# Patient Record
Sex: FEMALE | Race: ASIAN | NOT HISPANIC OR LATINO | Employment: FULL TIME | URBAN - METROPOLITAN AREA
[De-identification: names, ages, dates, MRNs, and addresses within clinical notes are randomized per-mention and may not be internally consistent; named-entity substitution may affect disease eponyms.]

---

## 2023-04-14 ENCOUNTER — HOSPITAL ENCOUNTER (EMERGENCY)
Facility: CLINIC | Age: 33
Discharge: HOME | End: 2023-04-15
Attending: EMERGENCY MEDICINE | Admitting: EMERGENCY MEDICINE
Payer: COMMERCIAL

## 2023-04-14 DIAGNOSIS — R06.02 SOB (SHORTNESS OF BREATH): ICD-10-CM

## 2023-04-14 DIAGNOSIS — R07.9 ACUTE CHEST PAIN: ICD-10-CM

## 2023-04-14 PROCEDURE — 93005 ELECTROCARDIOGRAM TRACING: CPT | Performed by: EMERGENCY MEDICINE

## 2023-04-14 PROCEDURE — 99285 EMERGENCY DEPT VISIT HI MDM: CPT | Mod: 25

## 2023-04-14 PROCEDURE — 96361 HYDRATE IV INFUSION ADD-ON: CPT

## 2023-04-14 PROCEDURE — 96374 THER/PROPH/DIAG INJ IV PUSH: CPT | Mod: 59

## 2023-04-14 NOTE — Clinical Note
Ivette Lam was seen and treated in our emergency department on 4/14/2023.  She may return to work on 04/16/2023.       If you have any questions or concerns, please don't hesitate to call.      Lin Louise MD

## 2023-04-15 ENCOUNTER — APPOINTMENT (OUTPATIENT)
Dept: CT IMAGING | Facility: CLINIC | Age: 33
End: 2023-04-15
Attending: EMERGENCY MEDICINE
Payer: COMMERCIAL

## 2023-04-15 VITALS
DIASTOLIC BLOOD PRESSURE: 78 MMHG | HEART RATE: 87 BPM | OXYGEN SATURATION: 99 % | RESPIRATION RATE: 18 BRPM | TEMPERATURE: 98.9 F | SYSTOLIC BLOOD PRESSURE: 121 MMHG | WEIGHT: 155 LBS

## 2023-04-15 LAB
ALBUMIN SERPL-MCNC: 4.3 G/DL (ref 3.5–5)
ALP SERPL-CCNC: 54 U/L (ref 45–120)
ALT SERPL W P-5'-P-CCNC: 18 U/L (ref 0–45)
ANION GAP SERPL CALCULATED.3IONS-SCNC: 10 MMOL/L (ref 5–18)
AST SERPL W P-5'-P-CCNC: 20 U/L (ref 0–40)
ATRIAL RATE - MUSE: 120 BPM
BASOPHILS # BLD AUTO: 0.1 10E3/UL (ref 0–0.2)
BASOPHILS NFR BLD AUTO: 0 %
BILIRUB DIRECT SERPL-MCNC: <0.1 MG/DL
BILIRUB SERPL-MCNC: 0.3 MG/DL (ref 0–1)
BUN SERPL-MCNC: 11 MG/DL (ref 8–22)
CALCIUM SERPL-MCNC: 9.2 MG/DL (ref 8.5–10.5)
CHLORIDE BLD-SCNC: 106 MMOL/L (ref 98–107)
CO2 SERPL-SCNC: 20 MMOL/L (ref 22–31)
CREAT SERPL-MCNC: 0.76 MG/DL (ref 0.6–1.1)
DIASTOLIC BLOOD PRESSURE - MUSE: NORMAL MMHG
EOSINOPHIL # BLD AUTO: 0.3 10E3/UL (ref 0–0.7)
EOSINOPHIL NFR BLD AUTO: 2 %
ERYTHROCYTE [DISTWIDTH] IN BLOOD BY AUTOMATED COUNT: 12.5 % (ref 10–15)
GFR SERPL CREATININE-BSD FRML MDRD: >90 ML/MIN/1.73M2
GLUCOSE BLD-MCNC: 101 MG/DL (ref 70–125)
HCG SERPL QL: NEGATIVE
HCT VFR BLD AUTO: 38.4 % (ref 35–47)
HGB BLD-MCNC: 13.3 G/DL (ref 11.7–15.7)
HOLD SPECIMEN: NORMAL
IMM GRANULOCYTES # BLD: 0 10E3/UL
IMM GRANULOCYTES NFR BLD: 0 %
INTERPRETATION ECG - MUSE: NORMAL
LIPASE SERPL-CCNC: 33 U/L (ref 0–52)
LYMPHOCYTES # BLD AUTO: 3.6 10E3/UL (ref 0.8–5.3)
LYMPHOCYTES NFR BLD AUTO: 30 %
MAGNESIUM SERPL-MCNC: 1.9 MG/DL (ref 1.8–2.6)
MCH RBC QN AUTO: 30.8 PG (ref 26.5–33)
MCHC RBC AUTO-ENTMCNC: 34.6 G/DL (ref 31.5–36.5)
MCV RBC AUTO: 89 FL (ref 78–100)
MONOCYTES # BLD AUTO: 0.6 10E3/UL (ref 0–1.3)
MONOCYTES NFR BLD AUTO: 5 %
NEUTROPHILS # BLD AUTO: 7.6 10E3/UL (ref 1.6–8.3)
NEUTROPHILS NFR BLD AUTO: 63 %
NRBC # BLD AUTO: 0 10E3/UL
NRBC BLD AUTO-RTO: 0 /100
P AXIS - MUSE: 70 DEGREES
PLATELET # BLD AUTO: 300 10E3/UL (ref 150–450)
POTASSIUM BLD-SCNC: 3.9 MMOL/L (ref 3.5–5)
PR INTERVAL - MUSE: 156 MS
PROT SERPL-MCNC: 8.4 G/DL (ref 6–8)
QRS DURATION - MUSE: 66 MS
QT - MUSE: 280 MS
QTC - MUSE: 395 MS
R AXIS - MUSE: 86 DEGREES
RBC # BLD AUTO: 4.32 10E6/UL (ref 3.8–5.2)
SODIUM SERPL-SCNC: 136 MMOL/L (ref 136–145)
SYSTOLIC BLOOD PRESSURE - MUSE: NORMAL MMHG
T AXIS - MUSE: 42 DEGREES
TROPONIN I SERPL-MCNC: <0.01 NG/ML (ref 0–0.29)
VENTRICULAR RATE- MUSE: 120 BPM
WBC # BLD AUTO: 12.1 10E3/UL (ref 4–11)

## 2023-04-15 PROCEDURE — 84484 ASSAY OF TROPONIN QUANT: CPT | Performed by: EMERGENCY MEDICINE

## 2023-04-15 PROCEDURE — 74174 CTA ABD&PLVS W/CONTRAST: CPT

## 2023-04-15 PROCEDURE — 250N000009 HC RX 250: Performed by: EMERGENCY MEDICINE

## 2023-04-15 PROCEDURE — 84703 CHORIONIC GONADOTROPIN ASSAY: CPT | Performed by: EMERGENCY MEDICINE

## 2023-04-15 PROCEDURE — 82248 BILIRUBIN DIRECT: CPT | Performed by: EMERGENCY MEDICINE

## 2023-04-15 PROCEDURE — 250N000011 HC RX IP 250 OP 636: Performed by: EMERGENCY MEDICINE

## 2023-04-15 PROCEDURE — 83690 ASSAY OF LIPASE: CPT | Performed by: EMERGENCY MEDICINE

## 2023-04-15 PROCEDURE — 85025 COMPLETE CBC W/AUTO DIFF WBC: CPT | Performed by: EMERGENCY MEDICINE

## 2023-04-15 PROCEDURE — 94640 AIRWAY INHALATION TREATMENT: CPT

## 2023-04-15 PROCEDURE — 36415 COLL VENOUS BLD VENIPUNCTURE: CPT | Performed by: EMERGENCY MEDICINE

## 2023-04-15 PROCEDURE — 999N000157 HC STATISTIC RCP TIME EA 10 MIN

## 2023-04-15 PROCEDURE — 70450 CT HEAD/BRAIN W/O DYE: CPT

## 2023-04-15 PROCEDURE — 258N000003 HC RX IP 258 OP 636: Performed by: EMERGENCY MEDICINE

## 2023-04-15 PROCEDURE — 80053 COMPREHEN METABOLIC PANEL: CPT | Performed by: EMERGENCY MEDICINE

## 2023-04-15 PROCEDURE — 83735 ASSAY OF MAGNESIUM: CPT | Performed by: EMERGENCY MEDICINE

## 2023-04-15 RX ORDER — IPRATROPIUM BROMIDE AND ALBUTEROL SULFATE 2.5; .5 MG/3ML; MG/3ML
3 SOLUTION RESPIRATORY (INHALATION) ONCE
Status: DISCONTINUED | OUTPATIENT
Start: 2023-04-15 | End: 2023-04-15

## 2023-04-15 RX ORDER — LEVALBUTEROL INHALATION SOLUTION 1.25 MG/3ML
1.25 SOLUTION RESPIRATORY (INHALATION) ONCE
Status: COMPLETED | OUTPATIENT
Start: 2023-04-15 | End: 2023-04-15

## 2023-04-15 RX ORDER — IOPAMIDOL 755 MG/ML
90 INJECTION, SOLUTION INTRAVASCULAR ONCE
Status: COMPLETED | OUTPATIENT
Start: 2023-04-15 | End: 2023-04-15

## 2023-04-15 RX ORDER — METHYLPREDNISOLONE SODIUM SUCCINATE 125 MG/2ML
125 INJECTION, POWDER, LYOPHILIZED, FOR SOLUTION INTRAMUSCULAR; INTRAVENOUS ONCE
Status: COMPLETED | OUTPATIENT
Start: 2023-04-15 | End: 2023-04-15

## 2023-04-15 RX ORDER — PREDNISONE 50 MG/1
50 TABLET ORAL DAILY
Qty: 4 TABLET | Refills: 0 | Status: SHIPPED | OUTPATIENT
Start: 2023-04-15 | End: 2023-04-19

## 2023-04-15 RX ADMIN — METHYLPREDNISOLONE SODIUM SUCCINATE 125 MG: 125 INJECTION, POWDER, FOR SOLUTION INTRAMUSCULAR; INTRAVENOUS at 03:17

## 2023-04-15 RX ADMIN — IOPAMIDOL 90 ML: 755 INJECTION, SOLUTION INTRAVENOUS at 01:56

## 2023-04-15 RX ADMIN — SODIUM CHLORIDE 1000 ML: 9 INJECTION, SOLUTION INTRAVENOUS at 00:55

## 2023-04-15 RX ADMIN — LEVALBUTEROL HYDROCHLORIDE 1.25 MG: 1.25 SOLUTION RESPIRATORY (INHALATION) at 00:19

## 2023-04-15 RX ADMIN — IPRATROPIUM BROMIDE 0.5 MG: 0.5 SOLUTION RESPIRATORY (INHALATION) at 00:19

## 2023-04-15 ASSESSMENT — ENCOUNTER SYMPTOMS
FEVER: 0
SPEECH DIFFICULTY: 0
NAUSEA: 0
HEADACHES: 0
VOMITING: 0
CONFUSION: 0
DYSURIA: 0
DIARRHEA: 1
SHORTNESS OF BREATH: 1
FACIAL ASYMMETRY: 0
COUGH: 0
PALPITATIONS: 1
ABDOMINAL DISTENTION: 1
ABDOMINAL PAIN: 0
NUMBNESS: 1

## 2023-04-15 ASSESSMENT — ACTIVITIES OF DAILY LIVING (ADL)
ADLS_ACUITY_SCORE: 35
ADLS_ACUITY_SCORE: 35

## 2023-04-15 NOTE — ED PROVIDER NOTES
EMERGENCY DEPARTMENT ENCOUNTER      NAME: Ivette Lam  AGE: 32 year old female  YOB: 1990  MRN: [id number]  EVALUATION DATE & TIME: No admission date for patient encounter.    PCP: Keke Isaac    ED PROVIDER: Lin Louise M.D.        Chief Complaint   Patient presents with     Chest Pain         FINAL IMPRESSION:    1. Acute chest pain    2. SOB (shortness of breath)            MEDICAL DECISION MAKIN year old female with history of asthma who presents emergency department with chest pain and shortness of breath that began yesterday.  Was seen at urgency room yesterday without diagnosis.  Here today laboratories and CT scan imaging unremarkable.  No signs for pneumonia, PE, aortic dissection, ACS, etc.  I do not have concerns for things like CHF.  She complained of a little bit of tingling in her face.  In the waiting room, but nursing did note in the triage assessment that she was breathing a little heavy at that time and likely related to her respiratory status.  I do not that this represents acute stroke.  I do not think she requires MRI imaging.  Plan at this time is discharge home with prescription for steroids and have her follow-up with primary care this next week.  Patient agrees with this plan and all of her questions have been answered.        ED COURSE:  12:06 AM  I met with the patient to gather history and perform my exam. ED course and treatment discussed.    3:15 AM  Patient overall is feeling better.  She states the nebulizer treatment did help.  CT imaging and laboratories look great.  Patient has had symptoms for over 24 hours.  Plan at this time is discharge home with prescription for steroids.  She will be given a dose of Solu-Medrol here in the ER prior to discharge.  No indication for antibiotics.  I do not think she requires admission to the hospital at this time and I do not think she requires any further imaging or laboratory evaluation.  Low  suspicion that this represents an infectious process such as influenza or COVID.    Patient agrees with the plan.  All of her questions have been answered.  We discussed what to watch for and when to return to the ER.    I do not think that this represents rib fractures, myocarditis, pericarditis, endocarditis, ACS, PE, ruptured AAA, pneumothorax, aortic dissection, bowel obstruction, bowel ischemia, cholecystitis, kidney stone, pyelonephritis, CVA, TIA, SAH, glaucoma, temporal arteritis, sinus thrombosis, vascular dissection, pseudotumor cerebri, meningitis, enchephalitis, hypertensive urgency or emergency, or other such etiologies. At this time I feel that this patient can be discharged from the emergency department and can followup as directed.    COVID-19 PPE worn during patient evaluation:  Mask: n95 and homemade masks   Eye Protection: none   Gown: none   Hair cover: yes  Face shield: none   Patient wearing a mask: yes     At the conclusion of the encounter I discussed the results of all of the tests and the disposition. Their questions were answered. The patient (and any family present) acknowledged understanding and were agreeable with the care plan.    CONSULTANTS:  none        MEDICATIONS GIVEN IN THE EMERGENCY:  Medications   0.9% sodium chloride BOLUS (0 mLs Intravenous Stopped 4/15/23 0302)   ipratropium (ATROVENT) 0.02 % neb solution 0.5 mg (0.5 mg Nebulization $Given 4/15/23 0019)     And   levalbuterol (XOPENEX) neb solution 1.25 mg (1.25 mg Nebulization $Given 4/15/23 0019)   iopamidol (ISOVUE-370) solution 90 mL (90 mLs Intravenous $Given 4/15/23 0156)   methylPREDNISolone sodium succinate (solu-MEDROL) injection 125 mg (125 mg Intravenous $Given 4/15/23 0317)           NEW PRESCRIPTIONS STARTED AT TODAY'S ER VISIT     Medication List      Started    predniSONE 50 MG tablet  Commonly known as: DELTASONE  50 mg, Oral, DAILY                CONDITION:  stable        DISPOSITION:  discharge home          =================================================================  =================================================================  TRIAGE ASSESSMENT:  Pt presents with midsternal chest pain that started yesterday. Pt reports shortness of breath associated with symptoms. Pt recently evaluated in clinic for similar symptoms. ABCs intact.      Triage Assessment     Row Name 04/14/23 2307       Triage Assessment (Adult)    Airway WDL WDL       Respiratory WDL    Respiratory WDL X;rhythm/pattern    Rhythm/Pattern, Respiratory shortness of breath       Skin Circulation/Temperature WDL    Skin Circulation/Temperature WDL WDL       Cardiac WDL    Cardiac WDL X;chest pain       Chest Pain Assessment    Chest Pain Location midsternal       Peripheral/Neurovascular WDL    Peripheral Neurovascular WDL WDL       Cognitive/Neuro/Behavioral WDL    Cognitive/Neuro/Behavioral WDL WDL                   ED Triage Vitals   Enc Vitals Group      BP 04/14/23 2307 (!) 143/109      Pulse 04/14/23 2307 118      Resp 04/14/23 2307 18      Temp 04/14/23 2307 98.9  F (37.2  C)      Temp src --       SpO2 04/14/23 2307 98 %      Weight 04/14/23 2309 70.3 kg (155 lb)       ================================================================  ================================================================    HPI    Patient information was obtained from: patient    Use of Intrepreter: N/A     Ivette C Nobello is a 32 year old female who denies any past medical history and who presents to the ER with complaints of chest pain and shortness of breath.    In the week she complained of some abdominal bloating and diarrhea but that has since resolved.  Yesterday she developed some right upper chest pain and tightness.  Ultimately was seen at urgency room and had nonspecific laboratories and negative chest x-ray.  She even had a negative D-dimer.    Care everywhere chart review shows a negative troponin, unremarkable BMP, unremarkable CBC,  negative D-dimer, normal lipase, and AST of 48, ALT of 29.  TSH normal, and urinalysis unremarkable.  She also had an ultrasound of her abdomen which did not show any signs of gallstones or biliary dilatation.  She does have some hepatic steatosis.    She otherwise denies any fevers, cough, abdominal pain, vomiting.  Few times today, including briefly in the waiting room she does complain of some right-sided facial tingling.  That has resolved.      REVIEW OF SYSTEMS  Review of Systems   Constitutional: Negative for fever.   Respiratory: Positive for shortness of breath. Negative for cough.    Cardiovascular: Positive for chest pain and palpitations.   Gastrointestinal: Positive for abdominal distention (resolved) and diarrhea (resolved). Negative for abdominal pain, nausea and vomiting.   Genitourinary: Negative for dysuria.   Allergic/Immunologic: Negative for immunocompromised state.   Neurological: Positive for numbness (right facial tingling earlier, resovled). Negative for facial asymmetry, speech difficulty and headaches.   Psychiatric/Behavioral: Negative for confusion.   All other systems reviewed and are negative.        PAST MEDICAL HISTORY:  History reviewed. No pertinent past medical history.      PAST SURGICAL HISTORY:  History reviewed. No pertinent surgical history.      CURRENT MEDICATIONS:    Prior to Admission medications    Not on File         ALLERGIES:  Allergies   Allergen Reactions     Penicillins Hives         FAMILY HISTORY:  History reviewed. No pertinent family history.      SOCIAL HISTORY:  Social History     Socioeconomic History     Marital status:      Spouse name: None     Number of children: None     Years of education: None     Highest education level: None         VITALS:  Patient Vitals for the past 24 hrs:   BP Temp Pulse Resp SpO2 Weight   04/15/23 0313 121/78 -- 87 -- 99 % --   04/15/23 0311 -- -- 92 -- 98 % --   04/15/23 0019 -- -- -- -- 100 % --   04/14/23 2309 -- --  -- -- -- 70.3 kg (155 lb)   04/14/23 2307 (!) 143/109 98.9  F (37.2  C) 118 18 98 % --       Wt Readings from Last 3 Encounters:   04/14/23 70.3 kg (155 lb)       CrCl cannot be calculated (Unknown ideal weight.).    PHYSICAL EXAM    Constitutional:  Well developed, Well nourished, NAD, GCS 15  HENT:  Normocephalic, Atraumatic, Bilateral external ears normal, Nose normal. Neck- Supple, No stridor.   Eyes:  PERRL, EOMI, Conjunctiva normal, No discharge.  Respiratory:  Normal breath sounds, No respiratory distress, No wheezing, Speaks full sentences easily. No cough.   Cardiovascular:  Normal heart rate, Regular rhythm, No murmurs, No rubs, No gallops. Chest wall nontender.   GI:  No excessive obesity.  Bowel sounds normal, Soft, No tenderness, No masses, No flank tenderness. No rebound or guarding.   : deferred  Musculoskeletal: 2+ DP pulses. No edema. No cyanosis, No clubbing. Good range of motion in all major joints. No tenderness to palpation or major deformities noted.   Integument:  Warm, Dry, No erythema, No rash.  No petechiae.   Neurologic:  Alert & oriented x 3, Normal motor function, Normal sensory function, No focal deficits noted. Normal gait.   Psychiatric:  Affect normal, Cooperative         LAB:  All pertinent labs reviewed and interpreted.  Recent Results (from the past 24 hour(s))   ECG 12-LEAD WITH MUSE ([facility])    Collection Time: 04/14/23 11:15 PM   Result Value Ref Range    Systolic Blood Pressure  mmHg    Diastolic Blood Pressure  mmHg    Ventricular Rate 120 BPM    Atrial Rate 120 BPM    AZ Interval 156 ms    QRS Duration 66 ms     ms    QTc 395 ms    P Axis 70 degrees    R AXIS 86 degrees    T Axis 42 degrees    Interpretation ECG       Sinus tachycardia  Anterior infarct , age undetermined  Abnormal ECG  No previous ECGs available  Confirmed by SEE ED PROVIDER NOTE FOR, ECG INTERPRETATION ([id number]),  Peggy Villa ([id number]) on 4/15/2023 12:58:00 AM     Basic metabolic panel     Collection Time: 04/15/23 12:46 AM   Result Value Ref Range    Sodium 136 136 - 145 mmol/L    Potassium 3.9 3.5 - 5.0 mmol/L    Chloride 106 98 - 107 mmol/L    Carbon Dioxide (CO2) 20 (L) 22 - 31 mmol/L    Anion Gap 10 5 - 18 mmol/L    Urea Nitrogen 11 8 - 22 mg/dL    Creatinine 0.76 0.60 - 1.10 mg/dL    Calcium 9.2 8.5 - 10.5 mg/dL    Glucose 101 70 - 125 mg/dL    GFR Estimate >90 >60 mL/min/1.73m2   Hepatic function panel    Collection Time: 04/15/23 12:46 AM   Result Value Ref Range    Bilirubin Total 0.3 0.0 - 1.0 mg/dL    Bilirubin Direct <0.1 <=0.5 mg/dL    Protein Total 8.4 (H) 6.0 - 8.0 g/dL    Albumin 4.3 3.5 - 5.0 g/dL    Alkaline Phosphatase 54 45 - 120 U/L    AST 20 0 - 40 U/L    ALT 18 0 - 45 U/L   Lipase    Collection Time: 04/15/23 12:46 AM   Result Value Ref Range    Lipase 33 0 - 52 U/L   Troponin I (now)    Collection Time: 04/15/23 12:46 AM   Result Value Ref Range    Troponin I <0.01 0.00 - 0.29 ng/mL   HCG QUALitative pregnancy (blood)    Collection Time: 04/15/23 12:46 AM   Result Value Ref Range    hCG Serum Qualitative Negative Negative   Extra Red Top Tube    Collection Time: 04/15/23 12:46 AM   Result Value Ref Range    Hold Specimen JIC    Extra Purple Top Tube    Collection Time: 04/15/23 12:46 AM   Result Value Ref Range    Hold Specimen JIC    CBC with platelets and differential    Collection Time: 04/15/23 12:46 AM   Result Value Ref Range    WBC Count 12.1 (H) 4.0 - 11.0 10e3/uL    RBC Count 4.32 3.80 - 5.20 10e6/uL    Hemoglobin 13.3 11.7 - 15.7 g/dL    Hematocrit 38.4 35.0 - 47.0 %    MCV 89 78 - 100 fL    MCH 30.8 26.5 - 33.0 pg    MCHC 34.6 31.5 - 36.5 g/dL    RDW 12.5 10.0 - 15.0 %    Platelet Count 300 150 - 450 10e3/uL    % Neutrophils 63 %    % Lymphocytes 30 %    % Monocytes 5 %    % Eosinophils 2 %    % Basophils 0 %    % Immature Granulocytes 0 %    NRBCs per 100 WBC 0 <1 /100    Absolute Neutrophils 7.6 1.6 - 8.3 10e3/uL    Absolute Lymphocytes 3.6 0.8 - 5.3 10e3/uL     Absolute Monocytes 0.6 0.0 - 1.3 10e3/uL    Absolute Eosinophils 0.3 0.0 - 0.7 10e3/uL    Absolute Basophils 0.1 0.0 - 0.2 10e3/uL    Absolute Immature Granulocytes 0.0 <=0.4 10e3/uL    Absolute NRBCs 0.0 10e3/uL   Magnesium    Collection Time: 04/15/23 12:46 AM   Result Value Ref Range    Magnesium 1.9 1.8 - 2.6 mg/dL   Extra Blue Top Tube    Collection Time: 04/15/23 12:46 AM   Result Value Ref Range    Hold Specimen JIC        No results found for: ABORH        RADIOLOGY:  Reviewed all pertinent imaging. Please see official radiology report.    Head CT w/o contrast   Final Result   IMPRESSION:   1.  No acute intracranial process.      CTA Chest Abdomen Pelvis w Contrast   Final Result   IMPRESSION:   1.  No acute/significant abnormalities seen in the chest, abdomen and pelvis, including acute vascular findings.   2.  Tiny hiatal hernia.               EKG:    Indication: Chest pain    Performed at: 23:15p  Impression: Sinus tachycardia at 120 bpm.  Flipped T waves noted in lead III, aVR.  Slow R wave progression.  AR interval 156 ms, QRS 66 ms, QTc 395 ms.  No prior EKGs to compare to.      I have independently reviewed and interpreted the EKG(s) documented above.        PROCEDURES:  none    Medical Decision Making    History:    Supplemental history from: Documented in chart, if applicable    External Record(s) reviewed: Documented in chart, if applicable. and Other: Urgency room results, see HPI.    Work Up:    Chart documentation includes differential considered and any EKGs or imaging independently interpreted by provider, where specified.    In additional to work up documented, I considered the following work up: Documented in chart, if applicable.    External consultation:    Discussion of management with another provider: Documented in chart, if applicable    Complicating factors:    Care impacted by chronic illness: N/A    Care affected by social determinants of health: N/A    Disposition considerations:  Discharge. I prescribed additional prescription strength medication(s) as charted. I considered admission, but ultimately discharged patient Work-up is very reassuring.  No hypoxia.  Overall she is feeling better..      Lin Louise M.D. [facility]  Emergency Medicine and Medical Toxicology  Counts include 234 beds at the [facility] EMERGENCY ROOM  [address] [place] [address]  [phone]  Dept: [phone]           Lin Louise MD  04/15/23 0318

## 2023-04-15 NOTE — DISCHARGE INSTRUCTIONS
All of your tests look great.  Continue using your albuterol inhaler.  I have written a prescription for some steroids.  You received your first dose here in the ER tonight.  I would take your next dose when you wake up in the morning on Sunday.    Make an appointment to follow-up with family doctor for next week.     Return to emergency department if you develop worsening chest pain, worsening shortness of breath, fever, abdominal pain, or any other concerns.    Thank you for choosing [facility] Emergency Department.  It has been my pleasure caring for you today.     ~Dr. Dani MD

## 2023-04-15 NOTE — ED NOTES
Pt was given 1 nebulizer, BS diminished on room air. Pt stated she does has a history of asthma and uses singulair and a rescue inhaler.

## 2023-04-15 NOTE — ED TRIAGE NOTES
Pt presents with midsternal chest pain that started yesterday. Pt reports shortness of breath associated with symptoms. Pt recently evaluated in clinic for similar symptoms. ABCs intact.      Triage Assessment     Row Name 04/14/23 3537       Triage Assessment (Adult)    Airway WDL WDL       Respiratory WDL    Respiratory WDL X;rhythm/pattern    Rhythm/Pattern, Respiratory shortness of breath       Skin Circulation/Temperature WDL    Skin Circulation/Temperature WDL WDL       Cardiac WDL    Cardiac WDL X;chest pain       Chest Pain Assessment    Chest Pain Location midsternal       Peripheral/Neurovascular WDL    Peripheral Neurovascular WDL WDL       Cognitive/Neuro/Behavioral WDL    Cognitive/Neuro/Behavioral WDL WDL

## 2023-08-13 ENCOUNTER — HEALTH MAINTENANCE LETTER (OUTPATIENT)
Age: 33
End: 2023-08-13

## 2024-10-06 ENCOUNTER — HEALTH MAINTENANCE LETTER (OUTPATIENT)
Age: 34
End: 2024-10-06